# Patient Record
Sex: FEMALE | Race: ASIAN | NOT HISPANIC OR LATINO | URBAN - METROPOLITAN AREA
[De-identification: names, ages, dates, MRNs, and addresses within clinical notes are randomized per-mention and may not be internally consistent; named-entity substitution may affect disease eponyms.]

---

## 2018-04-24 ENCOUNTER — APPOINTMENT (RX ONLY)
Dept: URBAN - METROPOLITAN AREA CLINIC 44 | Facility: CLINIC | Age: 73
Setting detail: DERMATOLOGY
End: 2018-04-24

## 2018-04-24 ENCOUNTER — RX ONLY (OUTPATIENT)
Age: 73
Setting detail: RX ONLY
End: 2018-04-24

## 2018-04-24 ENCOUNTER — APPOINTMENT (RX ONLY)
Dept: URBAN - METROPOLITAN AREA CLINIC 45 | Facility: CLINIC | Age: 73
Setting detail: DERMATOLOGY
End: 2018-04-24

## 2018-04-24 DIAGNOSIS — L259 CONTACT DERMATITIS AND OTHER ECZEMA, UNSPECIFIED CAUSE: ICD-10-CM

## 2018-04-24 PROBLEM — L23.9 ALLERGIC CONTACT DERMATITIS, UNSPECIFIED CAUSE: Status: ACTIVE | Noted: 2018-04-24

## 2018-04-24 PROBLEM — I10 ESSENTIAL (PRIMARY) HYPERTENSION: Status: ACTIVE | Noted: 2018-04-24

## 2018-04-24 PROCEDURE — ? ADDITIONAL NOTES

## 2018-04-24 PROCEDURE — ? PRESCRIPTION

## 2018-04-24 PROCEDURE — ? TREATMENT REGIMEN

## 2018-04-24 PROCEDURE — ? COUNSELING

## 2018-04-24 PROCEDURE — 99202 OFFICE O/P NEW SF 15 MIN: CPT

## 2018-04-24 RX ORDER — METHYLPREDNISOLONE 8 MG/1
TABLET ORAL
Qty: 33 | Refills: 0 | Status: ERX | COMMUNITY
Start: 2018-04-24

## 2018-04-24 RX ORDER — MUPIROCIN 20 MG/G
OINTMENT TOPICAL
Qty: 1 | Refills: 1 | Status: ERX | COMMUNITY
Start: 2018-04-24

## 2018-04-24 RX ADMIN — MUPIROCIN: 20 OINTMENT TOPICAL at 00:00

## 2018-04-24 ASSESSMENT — SEVERITY ASSESSMENT
SEVERITY: MODERATE TO SEVERE
SEVERITY: MODERATE TO SEVERE

## 2018-04-24 ASSESSMENT — BSA RASH
BSA RASH: 1
BSA RASH: 1

## 2018-04-24 NOTE — PROCEDURE: MIPS QUALITY
Quality 226: Preventive Care And Screening: Tobacco Use: Screening And Cessation Intervention: Patient screened for tobacco and never smoked
Quality 431: Preventive Care And Screening: Unhealthy Alcohol Use - Screening: Patient screened for unhealthy alcohol use using a single question and scores less than 2 times per year
Quality 111:Pneumonia Vaccination Status For Older Adults: Pneumococcal Vaccination Previously Received
Detail Level: Detailed
Quality 47: Advance Care Plan: Advance Care Planning discussed and documented in the medical record; patient did not wish or was not able to name a surrogate decision maker or provide an advance care plan.
Quality 130: Documentation Of Current Medications In The Medical Record: Current Medications Documented

## 2018-04-24 NOTE — HPI: BLISTERS
How Severe Are Your Blisters?: moderate
Please Check The Phrase That Best Describes Your Blisters.: focal
Is This A New Presentation, Or A Follow-Up?: Blisters
Additional History: Patient’s  states that 3 days ago they were working in the yards and the the patient developed the blisters.

## 2018-04-24 NOTE — PROCEDURE: ADDITIONAL NOTES
Additional Notes: This acutely eczematous / bullous plaque appeared 3 days ago after the patient was helping her  burn vegetation in the yard while wearing culottes.  Patient doesn't speak English, so  is translating, but he denies exposure to poison ivy.  It itches 8-9/10.\\n\\nI do think this is likely rhus dermatitis or another acute contact dermatitis.  Although linear streaks are absent, it is actively weeping serous exudate and the bullae are easily ruptured.  I did show them [employer] photos of acute vesicular poison ivy dermatitis.\\n\\nRec cool/wet wraps for next 2-3 days.\\nStart MUPIROCIN lauryn.\\nStart TAC 0.5% lauryn with dressing changes until samples gone.\\n\\nStart METHYLPREDNISOLONE taper:\\n-24 mg QD x 5d\\n-16 mg QD x 5d\\n-8 mg QD x 5d\\n-4 mg QD x 6d\\n\\nI did review AEs of systemic steroids including appetite increase, nick, and transient elevations in BP or blood glucose.\\n\\nRTC 3 weeks.

## 2018-04-24 NOTE — PROCEDURE: MIPS QUALITY
Quality 431: Preventive Care And Screening: Unhealthy Alcohol Use - Screening: Patient screened for unhealthy alcohol use using a single question and scores less than 2 times per year
Detail Level: Detailed
Quality 130: Documentation Of Current Medications In The Medical Record: Current Medications Documented
Quality 111:Pneumonia Vaccination Status For Older Adults: Pneumococcal Vaccination Previously Received
Quality 226: Preventive Care And Screening: Tobacco Use: Screening And Cessation Intervention: Patient screened for tobacco and never smoked
Quality 47: Advance Care Plan: Advance Care Planning discussed and documented in the medical record; patient did not wish or was not able to name a surrogate decision maker or provide an advance care plan.

## 2018-04-24 NOTE — PROCEDURE: ADDITIONAL NOTES
Additional Notes: This acutely eczematous / bullous plaque appeared 3 days ago after the patient was helping her  burn vegetation in the yard while wearing culottes.  Patient doesn't speak English, so  is translating, but he denies exposure to poison ivy.  It itches 8-9/10.\\n\\nI do think this is likely rhus dermatitis or another acute contact dermatitis.  Although linear streaks are absent, it is actively weeping serous exudate and the bullae are easily ruptured.  I did show them [employer] photos of acute vesicular poison ivy dermatitis.\\n\\nRec cool/wet wraps for next 2-3 days.\\nStart MUPIROCIN elinor.\\nStart TAC 0.5% elinor with dressing changes until samples gone.\\n\\nStart METHYLPREDNISOLONE taper:\\n-24 mg QD x 5d\\n-16 mg QD x 5d\\n-8 mg QD x 5d\\n-4 mg QD x 6d\\n\\nI did review AEs of systemic steroids including appetite increase, verenice, and transient elevations in BP or blood glucose.\\n\\nRTC 3 weeks.

## 2018-04-25 ENCOUNTER — RX ONLY (OUTPATIENT)
Age: 73
Setting detail: RX ONLY
End: 2018-04-25

## 2018-04-25 RX ORDER — METHYLPREDNISOLONE 8 MG/1
TABLET ORAL
Qty: 33 | Refills: 0 | Status: ERX

## 2018-04-26 ENCOUNTER — RX ONLY (OUTPATIENT)
Age: 73
Setting detail: RX ONLY
End: 2018-04-26

## 2018-04-26 RX ORDER — PREDNISONE 5 MG/1
TABLET ORAL
Qty: 1 | Refills: 0 | Status: ERX | COMMUNITY
Start: 2018-04-26

## 2018-05-01 ENCOUNTER — RX ONLY (OUTPATIENT)
Age: 73
Setting detail: RX ONLY
End: 2018-05-01

## 2018-05-01 RX ORDER — TRIAMCINOLONE ACETONIDE 1 MG/G
OINTMENT TOPICAL BID
Qty: 1 | Refills: 0 | Status: ERX | COMMUNITY
Start: 2018-05-01

## 2018-05-16 ENCOUNTER — APPOINTMENT (RX ONLY)
Dept: URBAN - METROPOLITAN AREA CLINIC 45 | Facility: CLINIC | Age: 73
Setting detail: DERMATOLOGY
End: 2018-05-16

## 2018-05-16 ENCOUNTER — APPOINTMENT (RX ONLY)
Dept: URBAN - METROPOLITAN AREA CLINIC 44 | Facility: CLINIC | Age: 73
Setting detail: DERMATOLOGY
End: 2018-05-16

## 2018-05-16 DIAGNOSIS — L259 CONTACT DERMATITIS AND OTHER ECZEMA, UNSPECIFIED CAUSE: ICD-10-CM | Status: IMPROVED

## 2018-05-16 PROBLEM — L23.9 ALLERGIC CONTACT DERMATITIS, UNSPECIFIED CAUSE: Status: ACTIVE | Noted: 2018-05-16

## 2018-05-16 PROCEDURE — 99212 OFFICE O/P EST SF 10 MIN: CPT

## 2018-05-16 PROCEDURE — ? ADDITIONAL NOTES

## 2018-05-16 PROCEDURE — ? COUNSELING

## 2018-05-16 ASSESSMENT — SEVERITY ASSESSMENT
SEVERITY: ALMOST CLEAR
SEVERITY: ALMOST CLEAR

## 2018-05-16 NOTE — PROCEDURE: ADDITIONAL NOTES
Additional Notes: Patient denies anymore itching. Patient took last dose METHYPREDNISOLONE today.
Additional Notes: This is virtually clear probable rhus dermatitis of the L lower leg.  It has responded to a 3 week taper of METHYLPREDNISOLONE.  The patient had also been using TAC cream BID here and there.  Patient and  are happy at this time.\\n\\nStart MUPIROCIN elinor BID x 7d to open erosions.  (Patient has at home.)\\n\\nRTC prn.

## 2018-05-16 NOTE — PROCEDURE: MIPS QUALITY
Quality 130: Documentation Of Current Medications In The Medical Record: Current Medications Documented
Quality 111:Pneumonia Vaccination Status For Older Adults: Pneumococcal Vaccination Previously Received
Detail Level: Detailed
Quality 226: Preventive Care And Screening: Tobacco Use: Screening And Cessation Intervention: Patient screened for tobacco and never smoked
Quality 47: Advance Care Plan: Advance Care Planning discussed and documented in the medical record; patient did not wish or was not able to name a surrogate decision maker or provide an advance care plan.
Quality 431: Preventive Care And Screening: Unhealthy Alcohol Use - Screening: Patient screened for unhealthy alcohol use using a single question and scores less than 2 times per year

## 2018-05-16 NOTE — PROCEDURE: MIPS QUALITY
Quality 431: Preventive Care And Screening: Unhealthy Alcohol Use - Screening: Patient screened for unhealthy alcohol use using a single question and scores less than 2 times per year
Quality 47: Advance Care Plan: Advance Care Planning discussed and documented in the medical record; patient did not wish or was not able to name a surrogate decision maker or provide an advance care plan.
Quality 226: Preventive Care And Screening: Tobacco Use: Screening And Cessation Intervention: Patient screened for tobacco and never smoked
Detail Level: Detailed
Quality 111:Pneumonia Vaccination Status For Older Adults: Pneumococcal Vaccination Previously Received
Quality 130: Documentation Of Current Medications In The Medical Record: Current Medications Documented

## 2021-05-28 ENCOUNTER — APPOINTMENT (RX ONLY)
Dept: URBAN - METROPOLITAN AREA CLINIC 45 | Facility: CLINIC | Age: 76
Setting detail: DERMATOLOGY
End: 2021-05-28

## 2021-05-28 ENCOUNTER — APPOINTMENT (RX ONLY)
Dept: URBAN - METROPOLITAN AREA CLINIC 44 | Facility: CLINIC | Age: 76
Setting detail: DERMATOLOGY
End: 2021-05-28

## 2021-05-28 DIAGNOSIS — L72.0 EPIDERMAL CYST: ICD-10-CM

## 2021-05-28 DIAGNOSIS — D485 NEOPLASM OF UNCERTAIN BEHAVIOR OF SKIN: ICD-10-CM

## 2021-05-28 PROBLEM — D48.5 NEOPLASM OF UNCERTAIN BEHAVIOR OF SKIN: Status: ACTIVE | Noted: 2021-05-28

## 2021-05-28 PROCEDURE — ? INCISION AND DRAINAGE

## 2021-05-28 PROCEDURE — 99204 OFFICE O/P NEW MOD 45 MIN: CPT | Mod: 25

## 2021-05-28 PROCEDURE — ? ADDITIONAL NOTES

## 2021-05-28 PROCEDURE — ? COUNSELING

## 2021-05-28 PROCEDURE — 10060 I&D ABSCESS SIMPLE/SINGLE: CPT

## 2021-05-28 PROCEDURE — ? PRESCRIPTION

## 2021-05-28 PROCEDURE — ? DEFER

## 2021-05-28 RX ORDER — MUPIROCIN 20 MG/G
OINTMENT TOPICAL
Qty: 1 | Refills: 0 | Status: ERX | COMMUNITY
Start: 2021-05-28

## 2021-05-28 RX ADMIN — MUPIROCIN THIN LAYER: 20 OINTMENT TOPICAL at 00:00

## 2021-05-28 ASSESSMENT — LOCATION SIMPLE DESCRIPTION DERM
LOCATION SIMPLE: LEFT SHOULDER
LOCATION SIMPLE: RIGHT UPPER BACK
LOCATION SIMPLE: RIGHT UPPER BACK
LOCATION SIMPLE: LEFT SHOULDER

## 2021-05-28 ASSESSMENT — LOCATION DETAILED DESCRIPTION DERM
LOCATION DETAILED: RIGHT SUPERIOR UPPER BACK
LOCATION DETAILED: LEFT POSTERIOR SHOULDER
LOCATION DETAILED: LEFT POSTERIOR SHOULDER
LOCATION DETAILED: RIGHT SUPERIOR UPPER BACK

## 2021-05-28 ASSESSMENT — LOCATION ZONE DERM
LOCATION ZONE: TRUNK
LOCATION ZONE: ARM
LOCATION ZONE: TRUNK
LOCATION ZONE: ARM

## 2021-05-28 NOTE — HPI: EVALUATION OF SKIN LESION(S)
What Type Of Note Output Would You Prefer (Optional)?: Standard Output
Hpi Title: Evaluation of Skin Lesions
How Severe Are Your Spot(S)?: mild
Have Your Spot(S) Been Treated In The Past?: has not been treated
Additional History: Patient is here with her

## 2021-05-28 NOTE — PROCEDURE: ADDITIONAL NOTES
Patient Management Risk Assessment: Moderate
Detail Level: Detailed
Additional Notes: I recommended biopsy because patient/ unsure how long it has been present but report recent change.  They declined at this time, prefer to photo and reexamine in a few months.
Render Risk Assessment In Note?: no
Additional Notes: I recommend post-I&D oral DOXY, but patient/ declined.\\n\\nPrescribed Mupirocin- apply a thin layer for 2 weeks\\nPatient will schedule in 2 months for excision\\nRTC 6 mo for back reeval.

## 2021-05-28 NOTE — PROCEDURE: DEFER
Reason To Defer Override: patient will wait to see if changes in morphology
Detail Level: Detailed
Introduction Text (Please End With A Colon): The following procedure was deferred:

## 2021-05-28 NOTE — PROCEDURE: INCISION AND DRAINAGE
Method: 11 blade
Dressing: dry sterile dressing
Wound Care: Vaseline
Anesthesia Volume In Cc: 4.5
Detail Level: Detailed
Epidermal Closure: simple interrupted
Size Of Lesion In Cm (Optional But May Be Required For Some Insurances): 2.2
Drainage Amount?: moderate
Anesthesia Type: 1% lidocaine without epinephrine
Drainage Type?: purulent  and keratinaceous
Epidermal Sutures: 4-0 Ethilon
Curette: No
Lesion Type: Cyst

## 2021-05-28 NOTE — PROCEDURE: INCISION AND DRAINAGE
Detail Level: Detailed
Lesion Type: Cyst
Method: 11 blade
Curette: No
Anesthesia Type: 1% lidocaine without epinephrine
Anesthesia Volume In Cc: 4.5
Size Of Lesion In Cm (Optional But May Be Required For Some Insurances): 2.2
Drainage Amount?: moderate
Drainage Type?: purulent  and keratinaceous
Wound Care: Vaseline
Dressing: dry sterile dressing
Epidermal Sutures: 4-0 Ethilon
Epidermal Closure: simple interrupted

## 2021-05-28 NOTE — HPI: EVALUATION OF SKIN LESION(S)
What Type Of Note Output Would You Prefer (Optional)?: Standard Output
How Severe Are Your Spot(S)?: mild
Have Your Spot(S) Been Treated In The Past?: has not been treated
Hpi Title: Evaluation of Skin Lesions
Additional History: Patient is here with her

## 2021-05-28 NOTE — PROCEDURE: ADDITIONAL NOTES
Detail Level: Detailed
Render Risk Assessment In Note?: no
Additional Notes: I recommend post-I&D oral DOXY, but patient/ declined.\\n\\nPrescribed Mupirocin- apply a thin layer for 2 weeks\\nPatient will schedule in 2 months for excision\\nRTC 6 mo for back reeval.
Patient Management Risk Assessment: Moderate
Additional Notes: I recommended biopsy because patient/ unsure how long it has been present but report recent change.  They declined at this time, prefer to photo and reexamine in a few months.

## 2023-07-10 NOTE — PROCEDURE: ADDITIONAL NOTES
Additional Notes: This is virtually clear probable rhus dermatitis of the L lower leg.  It has responded to a 3 week taper of METHYLPREDNISOLONE.  The patient had also been using TAC cream BID here and there.  Patient and  are happy at this time.\\n\\nStart MUPIROCIN lauryn BID x 7d to open erosions.  (Patient has at home.)\\n\\nRTC prn.
Additional Notes: Patient denies anymore itching. Patient took last dose METHYPREDNISOLONE today.
Admission